# Patient Record
(demographics unavailable — no encounter records)

---

## 2025-01-24 NOTE — DATA REVIEWED
[FreeTextEntry1] : 1/22/25 OC X-RAY Left Knee 4 views: This scan was reviewed and interpreted by Dr. Polanco, and his findings are- ACL screws noted, grade 3 medial OA

## 2025-01-24 NOTE — PHYSICAL EXAM
[de-identified] : Neurologic: normal mood and affect, orientated and able to communicate Constitutional: well developed and well nourished  Left Knee: medial joint line tenderness +Medial Shawn's +Locking Full ROM Pain with full extension Medial buckling lateral joint line tenderness +lateral Shawn's +Locking Pain with full extension lateral buckling +Lachman +anterior drawer Proximal MCL tenderness 1+ valgus with pain

## 2025-01-24 NOTE — HISTORY OF PRESENT ILLNESS
[de-identified] : The patient is a 63 year old RIGHT hand dominant male who presents today complaining of LEFT knee pain. Date of Injury/Onset: chronic Pain:    At Rest: 0-1/10  With Activity:  4/10  Mechanism of injury: NKI, patient reports history of multiple injuries over the span of 30 years with 2 surgeries. This is NOT a Work Related Injury being treated under Worker's Compensation. This is NOT an athletic injury occurring associated with an interscholastic or organized sports team. Quality of symptoms: sharp in different locations of knee Improves with: copper knee sleeve Worse with: up and down stairs, limited ROM. Prior treatment: Yes , years ago.  Prior Imaging: No recent imaging.  Out of work/sport: _, since  n/a School/Sport/Position/Occupation:business owner.  Additional Information: None

## 2025-01-24 NOTE — HISTORY OF PRESENT ILLNESS
----- Message from Monica Casey RN sent at 3/6/2019  8:25 AM CST -----  Please schedule a neuro followup appt for 4-6 weeks. Patient was inpatient and seen by Dr Gil. Please contact patient with date and time of appt.    [de-identified] : The patient is a 63 year old RIGHT hand dominant male who presents today complaining of LEFT knee pain. Date of Injury/Onset: chronic Pain:    At Rest: 0-1/10  With Activity:  4/10  Mechanism of injury: NKI, patient reports history of multiple injuries over the span of 30 years with 2 surgeries. This is NOT a Work Related Injury being treated under Worker's Compensation. This is NOT an athletic injury occurring associated with an interscholastic or organized sports team. Quality of symptoms: sharp in different locations of knee Improves with: copper knee sleeve Worse with: up and down stairs, limited ROM. Prior treatment: Yes , years ago.  Prior Imaging: No recent imaging.  Out of work/sport: _, since  n/a School/Sport/Position/Occupation:business owner.  Additional Information: None

## 2025-01-24 NOTE — PHYSICAL EXAM
[de-identified] : Neurologic: normal mood and affect, orientated and able to communicate Constitutional: well developed and well nourished  Left Knee: medial joint line tenderness +Medial Shawn's +Locking Full ROM Pain with full extension Medial buckling lateral joint line tenderness +lateral Shawn's +Locking Pain with full extension lateral buckling +Lachman +anterior drawer Proximal MCL tenderness 1+ valgus with pain

## 2025-01-24 NOTE — DISCUSSION/SUMMARY
[de-identified] : Reviewed all X Ray images with patient today and interpretation was provided. Discussed treatment options, the patient would like to follow through with left knee HELEN injection. Packet of exercises provided for home strengthening and/or stretching. OA Patient has tried physical therapy, anti-inflammatories, rest, with no improvement. Let this note serve as a letter of medical necessity for authorization of hyaluronic acid visco injection(s). NISA Patient will follow up after authorization.     ----------------------------------------------- Home Exercise The patient is instructed on a home exercise program.  ALINE COYLE Acting as a Scribe for Dr. Collin ANTONY, Aline Coyle, attest that this documentation has been prepared under the direction and in the presence of Provider Dr. Polanco.  Activity Modification The patient was advised to modify their activities.  Dx / Natural History The patient was advised of the diagnosis. The natural history of the pathology was explained in full to the patient in layman's terms. Several different treatment options were discussed and explained in full to the patient including the risks and benefits of both surgical and non-surgical treatments.  All questions and concerns were answered.  Pain Guide Activities The patient was advised to let pain guide the gradual advancement of activities.  RICE I explained to the patient that rest, ice, compression, and elevation would benefit them. They may return to activity after follow-up or when they no longer have any pain.  The patient's current medication management of their orthopedic diagnosis was reviewed today: (1) We discussed a comprehensive treatment plan that included possible pharmaceutical management involving the use of prescription strength medications including but not limited to options such as oral Naprosyn 500mg BID, once daily Meloxicam 15 mg, or 500-650 mg Tylenol versus over the counter oral medications and topical prescription NSAID Pennsaid vs over the counter Voltaren gel. (2) There is a moderate risk of morbidity with further treatment, especially from use of prescription strength medications and possible side effects of these medications which include upset stomach with oral medications, skin reactions to topical medications and cardiac/renal issues with long term use. (3) I recommended that the patient follow-up with their medical physician to discuss any significant specific potential issues with long term medication use such as interactions with current medications or with exacerbation of underlying medical comorbidities. (4) The benefits and risks associated with use of injectable, oral or topical, prescription and over the counter anti-inflammatory medications were discussed with the patient. The patient voiced understanding of the risks including but not limited to bleeding, stroke, kidney dysfunction, heart disease, and were referred to the black box warning label for further information.

## 2025-01-24 NOTE — ADDENDUM
[FreeTextEntry1] : Documented by Germain Gay acting as a scribe for Dr. Polanco and Malik Barnett PA-C on 01/22/2025 and was presence for the following sections: Physical Exam; Data Reviewed; Assessment; Discussion/Summary. All medical record entries made by the Scribe were at my, Dr. Polanco, and Malik Barnett, direction and personally dictated by me on 01/22/2025. I have reviewed the chart and agree that the record accurately reflects my personal performance of the history, physical exam, procedure and imaging.

## 2025-01-24 NOTE — DISCUSSION/SUMMARY
[de-identified] : Reviewed all X Ray images with patient today and interpretation was provided. Discussed treatment options, the patient would like to follow through with left knee HELEN injection. Packet of exercises provided for home strengthening and/or stretching. OA Patient has tried physical therapy, anti-inflammatories, rest, with no improvement. Let this note serve as a letter of medical necessity for authorization of hyaluronic acid visco injection(s). NISA Patient will follow up after authorization.     ----------------------------------------------- Home Exercise The patient is instructed on a home exercise program.  ALINE COYLE Acting as a Scribe for Dr. Collin ANTONY, Aline Coyle, attest that this documentation has been prepared under the direction and in the presence of Provider Dr. Polanco.  Activity Modification The patient was advised to modify their activities.  Dx / Natural History The patient was advised of the diagnosis. The natural history of the pathology was explained in full to the patient in layman's terms. Several different treatment options were discussed and explained in full to the patient including the risks and benefits of both surgical and non-surgical treatments.  All questions and concerns were answered.  Pain Guide Activities The patient was advised to let pain guide the gradual advancement of activities.  RICE I explained to the patient that rest, ice, compression, and elevation would benefit them. They may return to activity after follow-up or when they no longer have any pain.  The patient's current medication management of their orthopedic diagnosis was reviewed today: (1) We discussed a comprehensive treatment plan that included possible pharmaceutical management involving the use of prescription strength medications including but not limited to options such as oral Naprosyn 500mg BID, once daily Meloxicam 15 mg, or 500-650 mg Tylenol versus over the counter oral medications and topical prescription NSAID Pennsaid vs over the counter Voltaren gel. (2) There is a moderate risk of morbidity with further treatment, especially from use of prescription strength medications and possible side effects of these medications which include upset stomach with oral medications, skin reactions to topical medications and cardiac/renal issues with long term use. (3) I recommended that the patient follow-up with their medical physician to discuss any significant specific potential issues with long term medication use such as interactions with current medications or with exacerbation of underlying medical comorbidities. (4) The benefits and risks associated with use of injectable, oral or topical, prescription and over the counter anti-inflammatory medications were discussed with the patient. The patient voiced understanding of the risks including but not limited to bleeding, stroke, kidney dysfunction, heart disease, and were referred to the black box warning label for further information.

## 2025-01-27 NOTE — PHYSICAL EXAM
[de-identified] : Neurologic: normal mood and affect, orientated and able to communicate Constitutional: well developed and well nourished  Left Knee: medial joint line tenderness +Medial Shawn's +Locking Full ROM Pain with full extension Medial buckling lateral joint line tenderness +lateral Shawn's +Locking Pain with full extension lateral buckling +Lachman +anterior drawer Proximal MCL tenderness 1+ valgus with pain

## 2025-01-27 NOTE — DISCUSSION/SUMMARY
[de-identified] : Left knee Durolane provided today. patient tolerated well. Patient will follow up as needed.    RB&A to HA injection discussed. All questions were answered. Patient wishes to move forward with injection today.     LEFT KNEE  DUROLANE INJECTION - ULTRASOUND GUIDED   Patient Identification   Name/: Verbal with patient and/or family   Procedure Verification:   Procedure confirmed with patient or family/designee   Consent for procedure: Verbal Consent Given   Relevant documentation completed, reviewed, and signed   Clinical indications for procedure confirmed   Time-out with all members of procedure team immediately prior to procedure:   Correct patient identified. Agreement on procedure. Correct side and site.       KNEE INTRAARTICULAR INJECTION - LEFT   After verbal consent and identification of the correct patient and correct site, the LEFT knee was prepped using alcohol swabs and betadine. This was allowed time to air dry. The pre-loaded DUROLANE was injected into the LEFT knee via the lateral knee portal with a 1 inch 22G needle. Ultrasound was used to ensure proper needle placement. The patient tolerated the procedure well. A sterile dressing was placed. After-care instructions were provided and included instructions to ice the area and to call if redness, pain, or fever develop.   ----------------------------------------------- Home Exercise The patient is instructed on a home exercise program.  BRONSON VALDES Acting as a Scribe for Dr. Collin ANTONY, Bronson Valdes, attest that this documentation has been prepared under the direction and in the presence of Provider John Polanco MD.  Activity Modification The patient was advised to modify their activities.  Dx / Natural History The patient was advised of the diagnosis.  The natural history of the pathology was explained in full to the patient in layman's terms.  Several different treatment options were discussed and explained in full to the patient including the risks and benefits of both surgical and non-surgical treatments.  All questions and concerns were answered.  Pain Guide Activities The patient was advised to let pain guide the gradual advancement of activities.  JENNY ANTONY explained to the patient that rest, ice, compression, and elevation would benefit them.  They may return to activity after follow-up or when they no longer have any pain.  The patient's current medication management of their orthopedic diagnosis was reviewed today: (1) We discussed a comprehensive treatment plan that included possible pharmaceutical management involving the use of prescription strength medications including but not limited to options such as oral Naprosyn 500mg BID, once daily Meloxicam 15 mg, or 500-650 mg Tylenol versus over the counter oral medications and topical prescription NSAID Pennsaid vs over the counter Voltaren gel. (2) There is a moderate risk of morbidity with further treatment, especially from use of prescription strength medications and possible side effects of these medications which include upset stomach with oral medications, skin reactions to topical medications and cardiac/renal issues with long term use. (3) I recommended that the patient follow-up with their medical physician to discuss any significant specific potential issues with long term medication use such as interactions with current medications or with exacerbation of underlying medical comorbidities. (4) The benefits and risks associated with use of injectable, oral or topical, prescription and over the counter anti-inflammatory medications were discussed with the patient. The patient voiced understanding of the risks including but not limited to bleeding, stroke, kidney dysfunction, heart disease, and were referred to the black box warning label for further information.

## 2025-01-27 NOTE — HISTORY OF PRESENT ILLNESS
[de-identified] : The patient is a 63 year old RIGHT hand dominant male who presents today complaining of LEFT knee pain. Date of Injury/Onset: chronic Pain:    At Rest: 0-1/10  With Activity:  4/10  Mechanism of injury: NKI, patient reports history of multiple injuries over the span of 30 years with 2 surgeries. This is NOT a Work Related Injury being treated under Worker's Compensation. This is NOT an athletic injury occurring associated with an interscholastic or organized sports team. Quality of symptoms: sharp in different locations of knee Improves with: copper knee sleeve Worse with: up and down stairs, limited ROM. Treatment/Imaging/Studies Since Last Visit: Presents today for durolane injection 	Reports Available For Review Today: _ Change since last visit:  Out of work/sport: _, since  n/a School/Sport/Position/Occupation:business owner.  Additional Information: None